# Patient Record
Sex: FEMALE | Race: WHITE
[De-identification: names, ages, dates, MRNs, and addresses within clinical notes are randomized per-mention and may not be internally consistent; named-entity substitution may affect disease eponyms.]

---

## 2018-04-13 ENCOUNTER — HOSPITAL ENCOUNTER (OUTPATIENT)
Dept: HOSPITAL 47 - RADMAMWWP | Age: 83
Discharge: HOME | End: 2018-04-13
Payer: MEDICARE

## 2018-04-13 DIAGNOSIS — R92.8: ICD-10-CM

## 2018-04-13 DIAGNOSIS — N63.11: Primary | ICD-10-CM

## 2018-04-13 PROCEDURE — 76642 ULTRASOUND BREAST LIMITED: CPT

## 2018-04-13 PROCEDURE — 77066 DX MAMMO INCL CAD BI: CPT

## 2018-04-13 NOTE — USB
Reason for exam: additional evaluation requested from abnormal screening.



History:

Patient is postmenopausal and has history of colon cancer at age 60.



US Breast Limited RT

Right breast ultrasound demonstrates a 0.8 x 0.5 x 0.8cm oval, hypoechoic, 

vascular lesion at 1 o'clock.



These results were verbally communicated with the patient and result sheet given 

to the patient on 4/13/18.





ASSESSMENT: Suspicious, BI-RAD 4



RECOMMENDATION:

Ultrasound core biopsy of the right breast.



Called Dr. Asif with mammographic findings and has scheduled an appointment for 

the patient for 5/10/18 at 10:30with Dr. Overton.

Biopsy scheduled for 4/23/18 at 2:00.



PRELIMINARY REPORT CALLED AND FAXED TO DR. OVERTON ON 4/13/18.

## 2018-04-13 NOTE — MM
Reason for exam: clinical finding.

Last mammogram was performed 6 years ago.



History:

Patient is postmenopausal and has history of colon cancer at age 60.



Physical Findings:

Nurse Summary: 2cm nodule in the right breast at 1 o'clock (nurse sharonda).



MG 3D Diag Mammo W/Cad MILDRED

Bilateral CC and MLO view(s) were taken.

Prior study comparison: April 2, 2012, bilateral digital screening mammo w/CAD.  

March 31, 2011, bilateral digital screening mammo w/CAD.

There are scattered fibroglandular densities.  Nodular density at site of clinical

concern right upper breast. Ultrasound is recommended.



These results were verbally communicated with the patient and result sheet given 

to the patient on 4/13/18.





ASSESSMENT: Incomplete: need additional imaging evaluation, BI-RAD 0



RECOMMENDATION:

Ultrasound of the right breast.

## 2018-04-23 ENCOUNTER — HOSPITAL ENCOUNTER (OUTPATIENT)
Dept: HOSPITAL 47 - RADUSWWP | Age: 83
Discharge: HOME | End: 2018-04-23
Payer: MEDICARE

## 2018-04-23 VITALS — RESPIRATION RATE: 16 BRPM

## 2018-04-23 VITALS — TEMPERATURE: 97.5 F | SYSTOLIC BLOOD PRESSURE: 167 MMHG | DIASTOLIC BLOOD PRESSURE: 76 MMHG | HEART RATE: 72 BPM

## 2018-04-23 VITALS — BODY MASS INDEX: 32.1 KG/M2

## 2018-04-23 DIAGNOSIS — Z88.1: ICD-10-CM

## 2018-04-23 DIAGNOSIS — N63.10: Primary | ICD-10-CM

## 2018-04-23 DIAGNOSIS — Z88.2: ICD-10-CM

## 2018-04-23 DIAGNOSIS — Z88.0: ICD-10-CM

## 2018-04-23 DIAGNOSIS — R92.8: ICD-10-CM

## 2018-04-23 PROCEDURE — 88305 TISSUE EXAM BY PATHOLOGIST: CPT

## 2018-04-23 PROCEDURE — 88342 IMHCHEM/IMCYTCHM 1ST ANTB: CPT

## 2018-04-23 PROCEDURE — 88341 IMHCHEM/IMCYTCHM EA ADD ANTB: CPT

## 2018-04-23 PROCEDURE — 19083 BX BREAST 1ST LESION US IMAG: CPT

## 2018-04-23 NOTE — USB
EXAMINATION TYPE: US biopsy breast VAD RT

 

DATE OF EXAM: 4/23/2018

 

CLINICAL HISTORY: Abnormal mammogram R92.8. Right breast mass for which biopsy 
was recommended at the 1:00 position.

 

TECHNIQUE: Ultrasound guided core biopsy of the right breast.  

 

COMPARISON: 4/13/2018

 

FINDINGS: The procedure of ultrasound guided core biopsy was explained to the 
patient.  Benefits, alternatives, and risks were discussed.  An informed 
consent was then obtained. Preprocedural timeout was performed. 

 

The patient was placed in supine positioning for  imaging and for the 
procedure. The overlying skin was prepped and draped in usual sterile fashion.  
Lidocaine buffered with bicarbonate was used as anesthetic into the skin and 
subcutaneous tissue up to the mass at the 1:00 position within the right 
breast.  A nick was made with surgical scalpel.  

 

Under ultrasound guidance, an 18-gauge spinal needle was used to attempt at 
aspiration in consideration that this could represent an epidermal inclusion 
cyst, however no fluid was withdrawn. The patient also notes recent antibiotic 
treatment. Subsequently a 12-gauge vacuum assisted biopsy gun device was used 
to obtain 5 core samples.  Following this, a biopsy clip was left in lesion and 
was well visualized sonographically within the mass.  

 

The patient tolerated the procedure well without any immediate complication.  
The patient was kept in the radiology department for short stay after the 
procedure and then discharged home in stable condition.  

 

 

 

IMPRESSION: Successful, uncomplicated ultrasound guided core biopsy of an 8 mm 
mass at the 1:00 position within the right breast, full pathology results to 
follow. 

 

Pathology Results: Benign



BREAST, RIGHT, 1:00, CORE BIOPSY: BENIGN LYMPH NODE WITH REACTIVE CHANGES AND 
PARTIAL FAT REPLACEMENT. SEE NOTE. 



Recommendation

Follow up ultrasound of the right breast in 6 months.
CARLOS

## 2018-07-13 ENCOUNTER — HOSPITAL ENCOUNTER (INPATIENT)
Dept: HOSPITAL 47 - EC | Age: 83
LOS: 3 days | Discharge: HOME | DRG: 66 | End: 2018-07-16
Attending: HOSPITALIST | Admitting: HOSPITALIST
Payer: MEDICARE

## 2018-07-13 VITALS — BODY MASS INDEX: 31.7 KG/M2

## 2018-07-13 DIAGNOSIS — Z88.0: ICD-10-CM

## 2018-07-13 DIAGNOSIS — I89.0: ICD-10-CM

## 2018-07-13 DIAGNOSIS — Z98.42: ICD-10-CM

## 2018-07-13 DIAGNOSIS — M19.041: ICD-10-CM

## 2018-07-13 DIAGNOSIS — E78.5: ICD-10-CM

## 2018-07-13 DIAGNOSIS — Z80.0: ICD-10-CM

## 2018-07-13 DIAGNOSIS — Z96.1: ICD-10-CM

## 2018-07-13 DIAGNOSIS — R26.9: ICD-10-CM

## 2018-07-13 DIAGNOSIS — R13.10: ICD-10-CM

## 2018-07-13 DIAGNOSIS — Z92.3: ICD-10-CM

## 2018-07-13 DIAGNOSIS — I65.21: ICD-10-CM

## 2018-07-13 DIAGNOSIS — E66.9: ICD-10-CM

## 2018-07-13 DIAGNOSIS — Z88.2: ICD-10-CM

## 2018-07-13 DIAGNOSIS — M19.042: ICD-10-CM

## 2018-07-13 DIAGNOSIS — Z79.82: ICD-10-CM

## 2018-07-13 DIAGNOSIS — R32: ICD-10-CM

## 2018-07-13 DIAGNOSIS — Z88.1: ICD-10-CM

## 2018-07-13 DIAGNOSIS — I61.3: Primary | ICD-10-CM

## 2018-07-13 DIAGNOSIS — Z98.41: ICD-10-CM

## 2018-07-13 DIAGNOSIS — Z90.49: ICD-10-CM

## 2018-07-13 DIAGNOSIS — I16.0: ICD-10-CM

## 2018-07-13 DIAGNOSIS — Z96.653: ICD-10-CM

## 2018-07-13 DIAGNOSIS — Z79.899: ICD-10-CM

## 2018-07-13 DIAGNOSIS — Z85.038: ICD-10-CM

## 2018-07-13 LAB
ALBUMIN SERPL-MCNC: 4 G/DL (ref 3.5–5)
ALP SERPL-CCNC: 90 U/L (ref 38–126)
ALT SERPL-CCNC: 19 U/L (ref 9–52)
ANION GAP SERPL CALC-SCNC: 11 MMOL/L
AST SERPL-CCNC: 18 U/L (ref 14–36)
BASOPHILS # BLD AUTO: 0 K/UL (ref 0–0.2)
BASOPHILS NFR BLD AUTO: 0 %
BUN SERPL-SCNC: 25 MG/DL (ref 7–17)
CALCIUM SPEC-MCNC: 9.5 MG/DL (ref 8.4–10.2)
CHLORIDE SERPL-SCNC: 106 MMOL/L (ref 98–107)
CK SERPL-CCNC: 43 U/L (ref 30–135)
CO2 SERPL-SCNC: 25 MMOL/L (ref 22–30)
EOSINOPHIL # BLD AUTO: 0.2 K/UL (ref 0–0.7)
EOSINOPHIL NFR BLD AUTO: 3 %
ERYTHROCYTE [DISTWIDTH] IN BLOOD BY AUTOMATED COUNT: 4.21 M/UL (ref 3.8–5.4)
ERYTHROCYTE [DISTWIDTH] IN BLOOD: 12.7 % (ref 11.5–15.5)
GLUCOSE SERPL-MCNC: 93 MG/DL (ref 74–99)
HCT VFR BLD AUTO: 36.9 % (ref 34–46)
HGB BLD-MCNC: 12.5 GM/DL (ref 11.4–16)
LYMPHOCYTES # SPEC AUTO: 1.1 K/UL (ref 1–4.8)
LYMPHOCYTES NFR SPEC AUTO: 18 %
MAGNESIUM SPEC-SCNC: 2.2 MG/DL (ref 1.6–2.3)
MCH RBC QN AUTO: 29.7 PG (ref 25–35)
MCHC RBC AUTO-ENTMCNC: 33.9 G/DL (ref 31–37)
MCV RBC AUTO: 87.7 FL (ref 80–100)
MONOCYTES # BLD AUTO: 0.4 K/UL (ref 0–1)
MONOCYTES NFR BLD AUTO: 7 %
NEUTROPHILS # BLD AUTO: 4.5 K/UL (ref 1.3–7.7)
NEUTROPHILS NFR BLD AUTO: 70 %
PH UR: 7.5 [PH] (ref 5–8)
PLATELET # BLD AUTO: 317 K/UL (ref 150–450)
POTASSIUM SERPL-SCNC: 4.5 MMOL/L (ref 3.5–5.1)
PROT SERPL-MCNC: 7.5 G/DL (ref 6.3–8.2)
SODIUM SERPL-SCNC: 142 MMOL/L (ref 137–145)
SP GR UR: 1.01 (ref 1–1.03)
TROPONIN I SERPL-MCNC: <0.012 NG/ML (ref 0–0.03)
UROBILINOGEN UR QL STRIP: <2 MG/DL (ref ?–2)
WBC # BLD AUTO: 6.5 K/UL (ref 3.8–10.6)

## 2018-07-13 PROCEDURE — 93880 EXTRACRANIAL BILAT STUDY: CPT

## 2018-07-13 PROCEDURE — 96361 HYDRATE IV INFUSION ADD-ON: CPT

## 2018-07-13 PROCEDURE — 83735 ASSAY OF MAGNESIUM: CPT

## 2018-07-13 PROCEDURE — 82553 CREATINE MB FRACTION: CPT

## 2018-07-13 PROCEDURE — 85025 COMPLETE CBC W/AUTO DIFF WBC: CPT

## 2018-07-13 PROCEDURE — 99291 CRITICAL CARE FIRST HOUR: CPT

## 2018-07-13 PROCEDURE — 70450 CT HEAD/BRAIN W/O DYE: CPT

## 2018-07-13 PROCEDURE — 70551 MRI BRAIN STEM W/O DYE: CPT

## 2018-07-13 PROCEDURE — 95819 EEG AWAKE AND ASLEEP: CPT

## 2018-07-13 PROCEDURE — 82550 ASSAY OF CK (CPK): CPT

## 2018-07-13 PROCEDURE — 85379 FIBRIN DEGRADATION QUANT: CPT

## 2018-07-13 PROCEDURE — 93005 ELECTROCARDIOGRAM TRACING: CPT

## 2018-07-13 PROCEDURE — 80061 LIPID PANEL: CPT

## 2018-07-13 PROCEDURE — 84484 ASSAY OF TROPONIN QUANT: CPT

## 2018-07-13 PROCEDURE — 80053 COMPREHEN METABOLIC PANEL: CPT

## 2018-07-13 PROCEDURE — 81003 URINALYSIS AUTO W/O SCOPE: CPT

## 2018-07-13 PROCEDURE — 36415 COLL VENOUS BLD VENIPUNCTURE: CPT

## 2018-07-13 PROCEDURE — 96360 HYDRATION IV INFUSION INIT: CPT

## 2018-07-13 PROCEDURE — 71046 X-RAY EXAM CHEST 2 VIEWS: CPT

## 2018-07-13 PROCEDURE — 93306 TTE W/DOPPLER COMPLETE: CPT

## 2018-07-13 RX ADMIN — ASPIRIN 325 MG ORAL TABLET STA: 325 PILL ORAL at 15:28

## 2018-07-13 RX ADMIN — ASPIRIN 325 MG ORAL TABLET STA MG: 325 PILL ORAL at 15:27

## 2018-07-13 NOTE — CT
EXAMINATION TYPE: CT brain wo con

 

DATE OF EXAM: 7/13/2018

 

COMPARISON:

 

HISTORY: Dizzy, elevated blood pressure

 

CT DLP: 1036 mGycm

 

Unenhanced CT of the brain was performed.  

 

The ventricles, basal cisterns and sulci overlying the cerebral convexities demonstrate mild enlargem
ent. 

 

There is no evidence for intracranial hemorrhage or sulcal effacement.  

 

There is decreased attenuation about the periventricular white matter and deep white matter of both c
erebral hemispheres, compatible with chronic small vessel ischemia. Differential diagnosis does inclu
de demyelination. 

 

No mass effects are seen.No midline shift.

 

Osseous calvarium is intact.  

 

If symptoms persist consider MRI.  

 

IMPRESSION:

 

1. Age related atrophic and chronic small vessel ischemic change without acute intracranial process s
een at this time.

## 2018-07-13 NOTE — XR
EXAMINATION TYPE: XR chest 2V

 

DATE OF EXAM: 7/13/2018

 

COMPARISON: 9/17/2014

 

HISTORY: Shortness of breath

 

TECHNIQUE:  Frontal and lateral views of the chest are obtained.

 

FINDINGS:

 

Scattered senescent parenchymal changes noted. Hyperinflation compatible with COPD. 

 

No evidence for infiltrate. No evidence for atelectasis.

 

Heart size is stable.

 

Mediastinal structures are stable and grossly unremarkable.

 

No evidence for hilar prominence.

 

Degenerative changes dorsal spine. 

 

IMPRESSION:

1. No evidence for acute pulmonary disease.

## 2018-07-13 NOTE — P.CNNES
History of Present Illness


Consult date: 07/13/18


Reason for Consult: Patient with altered mental status and dizziness.


History of Present Illness: 





This patient is a 87-year-old right-handed white female who apparently 

yesterday evening noticed that she was having some difficulty with her sense of 

balance.  She states that she is feeling "woozy" which meant she was just not 

feeling well.  She also noted yesterday evening that she was having difficulty 

with her swallowing mechanism as when she tried to drink of fluid it was 

causing her to cough and gag.  She decided to wait until this morning to see if 

her symptoms would improve.  This morning she was still having trouble with her 

swallowing mechanism stating that it was hard for her to drink liquids.  Later 

on in the day she was able to have some toast which apparently did not cause 

trouble.  She still continued to have symptoms of dizziness.  She describes it 

as a feeling of imbalance.  She did not have any true vertigo symptoms at the 

time.  She was also feeling that she was having trouble walking but attributed 

much of this to her known history of sciatica in the past.  The patient decided 

to come to the emergency room today for further evaluation.  She was seen in 

the ER by Dr. Andrea.  Her initial blood pressure reading in the ER was 193/78 

which was quite elevated and she was treated for this.  The patient was 

reevaluated in the ER by Dr. Andrea and she still had difficulty with swallowing.

  She was admitted to hospital for further evaluation.  ENT consultation is 

pending.  Patient denies any previous history of TIA or stroke.  She does have 

history of underlying colon cancer which was diagnosed in 1991.  She underwent 

resection with chemotherapy.  She has yearly colonoscopy procedures.  She also 

has a history of bilateral knee surgeries in 2010.  She states she had been 

ambulating well but this new weakness came on suddenly.  The patient did 

undergo a computed tomography scan of the brain in the ER which revealed age 

related atrophy and chronic small vessel ischemic changes.  No acute 

intracranial abnormality was detected.  We reviewed the CAT scan results today 

with the patient.  We'll recommend patient undergo MRI of the brain to rule out 

any brainstem ischemia.  We will need to wait to see how she her ENT evaluation 

turns out in terms of her swallowing difficulties.  She does have a gag reflex 

and does seem to do well with liquids and soft foods at this time without 

showing signs of gagging.  She may require speech therapy consultation as well.

  The patient is unable to take aspirin as she has a high bleeding tendency.  

She is now been admitted and neurology has been consulted for further 

evaluation and recommendations.





Review of Systems


Constitutional: Denies chills, Denies fever


Eyes: denies blurred vision, denies pain


Ears, nose, mouth and throat: Denies headache, Denies sore throat


Cardiovascular: Denies chest pain, Denies shortness of breath


Respiratory: Denies cough


Gastrointestinal: Denies abdominal pain, Denies diarrhea, Denies nausea, Denies 

vomiting


Genitourinary: Denies dysuria, Denies hematuria


Musculoskeletal: Denies myalgias


Integumentary: Denies pruritus, Denies rash


Neurological: Reports change in mentation, Reports confusion, Reports gait 

dysfunction, Reports motor disturbance, Denies numbness, Denies weakness


Psychiatric: Denies anxiety, Denies depression


Endocrine: Denies fatigue, Denies weight change





Past Medical History


Past Medical History: Cancer, Hypertension


Additional Past Medical History / Comment(s): anemia, hx colon cancer, 

lymphedema right arm


History of Any Multi-Drug Resistant Organisms: None Reported


Past Surgical History: Bowel Resection, Joint Replacement


Additional Past Surgical History / Comment(s): cesar knee replacements, cesar 

cataract with lens implants


Past Anesthesia/Blood Transfusion Reactions: No Reported Reaction


Past Psychological History: No Psychological Hx Reported


Smoking Status: Never smoker


Past Alcohol Use History: None Reported


Past Drug Use History: None Reported





- Past Family History


  ** Mother


Family Medical History: Cancer


Additional Family Medical History / Comment(s): colon





Medications and Allergies


 Home Medications











 Medication  Instructions  Recorded  Confirmed  Type


 


Cholecalciferol [Vitamin D3] 1,000 unit PO DAILY 12/11/17 07/13/18 History


 


Ferrous Sulfate [Feosol] 325 mg PO Q48H 12/11/17 07/13/18 History


 


Losartan/Hydrochlorothiazide 1 tab PO DAILY 12/11/17 07/13/18 History





[Hyzaar 100-25 Tablet]    


 


Multivitamins, Thera [Multivitamin 1 tab PO Q48H 12/11/17 07/13/18 History





(formulary)]    


 


Omega-3 Fatty Acids [Omega-3] 1,000 mg PO DAILY 04/16/18 07/13/18 History


 


Cetirizine HCl [Zyrtec] 10 mg PO DAILY 07/13/18 07/13/18 History











 Allergies











Allergy/AdvReac Type Severity Reaction Status Date / Time


 


clarithromycin [From Biaxin] Allergy  Rash/Hives Verified 07/13/18 12:11


 


Penicillins Allergy  Unknown Verified 07/13/18 12:11


 


Sulfa (Sulfonamide Allergy  ringing in Verified 07/13/18 12:11





Antibiotics)   ears  














Physical Examination





- Vital Signs


Vital Signs: 


 Vital Signs











  Temp Pulse Pulse Resp BP BP Pulse Ox


 


 07/13/18 17:52  97.3 F L  56 L   18  184/73   97


 


 07/13/18 16:15    73  18   184/72  97


 


 07/13/18 16:10   71   16  192/79   98


 


 07/13/18 15:32    78  18   199/76  97


 


 07/13/18 14:34   78   18  185/81   98


 


 07/13/18 11:31  98.4 F  75   18  193/78   99








 Intake and Output











 07/13/18 07/13/18 07/13/18





 06:59 14:59 22:59


 


Other:   


 


  Weight  78.653 kg 














- Constitutional


General appearance: average body habitus, cooperative





- EENT


EENT: PERRL, mucous membranes moist





- Respiratory


Respiratory: lungs clear, normal breath sounds





- Cardiovascular


Cardiovascular: regular rate, normal S1, normal S2


Extremities: no peripheral edema bilaterally





- Gastrointestinal


Gastrointestinal: normoactive bowel sounds





- Integumentary


Integumentary: normal





- Neurologic


Cranial nerve examination: PERRL, EOMI, VFF, face symmetric, intact gag reflex, 

intact corneal reflex, normal palatal elevation


Speech examination: intact


Sensorimotor examination: intact


Motor examination - right side: 4/5: biceps, triceps, wrist flexion, wrist 

extension, , hip flexors, knee extensors, dorsiflexion, toe extension (EHL)

, plantarflexion


Motor examination - left side: 4/5: biceps, triceps, wrist flexion, wrist 

extension, , hip flexors, knee extensors, dorsiflexion, toe extension (EHL)

, plantarflexion


Detailed sensory examination: intact


Reflex and gait examination: intact


Reflexes: 1+: ankle, bicep, knee, tricep





- Musculoskeletal


Musculoskeletal: no pain





- Psychiatric


Psychiatric: mood/affect appropriate, cooperative





Results





- Laboratory Findings


CBC and BMP: 


 07/13/18 12:06





 07/13/18 12:06


Abnormal Lab Findings: 


 Abnormal Labs











  07/13/18 07/13/18





  12:06 12:06


 


D-Dimer   0.65 H


 


BUN  25 H 














Assessment and Plan


(1) Transient brainstem ischemia


Current Visit: Yes   Status: Acute   Code(s): G45.8 - OTH TRANSIENT CEREBRAL 

ISCHEMIC ATTACKS AND RELATED SYND   SNOMED Code(s): 99166352


   





(2) Dysphagia


Current Visit: Yes   Status: Acute   Code(s): R13.10 - DYSPHAGIA, UNSPECIFIED   

SNOMED Code(s): 54718197


   





(3) Dizziness


Current Visit: Yes   Status: Acute   Code(s): R42 - DIZZINESS AND GIDDINESS   

SNOMED Code(s): 597833317


   





(4) History of colon cancer


Current Visit: Yes   Status: Acute   Code(s): Z85.038 - PERSONAL HISTORY OF 

MALIGNANT NEOPLASM OF LARGE INTESTINE   SNOMED Code(s): 435270420


   


Plan: 





This patient is a pleasant 87-year-old right-handed white female who was 

admitted hospital today with symptoms of difficulty swallowing as well as 

trouble ambulating at home.  Symptoms did not improve this morning on awakening 

and she decided to come to the emergency room.  In the ER she was evaluated by 

Dr. Andrea.  She had evidence of hypertensive urgency.  She was sent for computed 

tomography scan of the brain results which are noted above.  She was 

subsequently admitted to hospital for further evaluation.  The patient denies 

any previous history of TIA or stroke.  She did have clear evidence of 

dysphagia yesterday and this morning which apparently has improved since 

admission to the hospital.  She is to be evaluated by ENT with consultation.  

Her neurological examination at this time is nonfocal.  We have recommended she 

undergo an MRI of the brain for further evaluation of brainstem ischemia given 

her swallowing difficulties.  She is unable to take aspirin as she has bleeding 

tendency.  We will obtain a full stroke workup for the patient during this 

admission.  Her overall prognosis at this time remains very guarded.


Time with Patient: Greater than 30

## 2018-07-13 NOTE — ED
Dizziness HPI





- General


Chief Complaint: Dizziness


Stated Complaint: Dizzy/high blood pressure


Time Seen by Provider: 07/13/18 11:40


Source: patient, family, RN notes reviewed


Mode of arrival: wheelchair


Limitations: no limitations





- History of Present Illness


Initial Comments: 





This is a 87-year-old female with no prior history of stroke she states she had 

the onset last evening of difficulty swallowing and trouble walking.  She 

states she felt dizzy his slight frontal headache and feels fuzzy "".  No focal 

weakness to her arms or legs she states her blood pressure was elevated 197/76.

  He denies any recent illnesses she does have sciatic nerve problems but does 

not relate this current problem with the sciatica.  No other complaints no 

other modifying factors at this time.  Per her family her speech seems to be 

normal the patient states she had some slight trouble with speech and was 

thinking about what she said before she would say it.


MD Complaint: lightheadedness, difficulty walking





- Related Data


 Home Medications











 Medication  Instructions  Recorded  Confirmed


 


Cholecalciferol [Vitamin D3] 1,000 unit PO DAILY 12/11/17 07/13/18


 


Ferrous Sulfate [Feosol] 325 mg PO Q48H 12/11/17 07/13/18


 


Losartan/Hydrochlorothiazide 1 tab PO DAILY 12/11/17 07/13/18





[Hyzaar 100-25 Tablet]   


 


Multivitamins, Thera [Multivitamin 1 tab PO Q48H 12/11/17 07/13/18





(formulary)]   


 


Omega-3 Fatty Acids [Omega-3] 1,000 mg PO DAILY 04/16/18 07/13/18


 


Cetirizine HCl [Zyrtec] 10 mg PO DAILY 07/13/18 07/13/18











 Allergies











Allergy/AdvReac Type Severity Reaction Status Date / Time


 


clarithromycin [From Biaxin] Allergy  Rash/Hives Verified 07/13/18 12:11


 


Penicillins Allergy  Unknown Verified 07/13/18 12:11


 


Sulfa (Sulfonamide Allergy  ringing in Verified 07/13/18 12:11





Antibiotics)   ears  














Review of Systems


ROS Statement: 


Those systems with pertinent positive or pertinent negative responses have been 

documented in the HPI.





ROS Other: All systems not noted in ROS Statement are negative.





Past Medical History


Past Medical History: Cancer, Hypertension


Additional Past Medical History / Comment(s): anemia, hx colon cancer, 

lymphedema right arm


History of Any Multi-Drug Resistant Organisms: None Reported


Past Surgical History: Bowel Resection, Joint Replacement


Additional Past Surgical History / Comment(s): cesar knee replacements, cesar 

cataract with lens implants


Past Anesthesia/Blood Transfusion Reactions: No Reported Reaction


Past Psychological History: No Psychological Hx Reported


Smoking Status: Never smoker


Past Alcohol Use History: None Reported


Past Drug Use History: None Reported





- Past Family History


  ** Mother


Family Medical History: Cancer


Additional Family Medical History / Comment(s): colon





General Exam





- General Exam Comments


Initial Comments: 





This is a well-developed well-nourished awake alert oriented 3 female


Limitations: no limitations


General appearance: alert, in no apparent distress


Head exam: Present: atraumatic, normocephalic, normal inspection


Eye exam: Present: normal appearance, PERRL, EOMI.  Absent: scleral icterus, 

conjunctival injection, periorbital swelling


ENT exam: Present: mucous membranes dry


Neck exam: Present: normal inspection.  Absent: tenderness, meningismus, 

lymphadenopathy


Respiratory exam: Present: normal lung sounds bilaterally.  Absent: respiratory 

distress, wheezes, rales, rhonchi, stridor


Cardiovascular Exam: Present: regular rate, normal rhythm, normal heart sounds.

  Absent: systolic murmur, diastolic murmur, rubs, gallop, clicks


GI/Abdominal exam: Present: soft, normal bowel sounds.  Absent: distended, 

tenderness, guarding, rebound, rigid


Extremities exam: Present: normal inspection, full ROM, normal capillary 

refill.  Absent: tenderness, pedal edema, joint swelling, calf tenderness


Back exam: Present: normal inspection


Neurological exam: Present: alert, oriented X3, CN II-XII intact


Psychiatric exam: Present: normal affect, normal mood


Skin exam: Present: warm, dry, intact, normal color.  Absent: rash





Course


 Vital Signs











  07/13/18 07/13/18





  11:31 14:34


 


Temperature 98.4 F 


 


Pulse Rate 75 78


 


Respiratory 18 18





Rate  


 


Blood Pressure 193/78 185/81


 


O2 Sat by Pulse 99 98





Oximetry  














- Reevaluation(s)


Reevaluation #1: 





07/13/18 15:12


Reevaluation patient reveals no change in her status she still is difficulty 

swallowing was able ambulate however.  No focal deficits.





EKG Findings





- EKG Results:


EKG: interpreted by JOSE, sinus rhythm (Sinus rhythm of 62 UT interval 150 QRS 

138 QT since /448 red bundle-branch block pattern possible lateral 

infarct of indeterminate age.)





Medical Decision Making





- Medical Decision Making





I did discuss findings with the patient family and with Dr. Contreras patient 

will be admitted with ENT and neurology evaluation.





- Lab Data


Result diagrams: 


 07/13/18 12:06





 07/13/18 12:06


 Lab Results











  07/13/18 07/13/18 07/13/18 Range/Units





  12:06 12:06 12:06 


 


WBC   6.5   (3.8-10.6)  k/uL


 


RBC   4.21   (3.80-5.40)  m/uL


 


Hgb   12.5   (11.4-16.0)  gm/dL


 


Hct   36.9   (34.0-46.0)  %


 


MCV   87.7   (80.0-100.0)  fL


 


MCH   29.7   (25.0-35.0)  pg


 


MCHC   33.9   (31.0-37.0)  g/dL


 


RDW   12.7   (11.5-15.5)  %


 


Plt Count   317   (150-450)  k/uL


 


Neutrophils %   70   %


 


Lymphocytes %   18   %


 


Monocytes %   7   %


 


Eosinophils %   3   %


 


Basophils %   0   %


 


Neutrophils #   4.5   (1.3-7.7)  k/uL


 


Lymphocytes #   1.1   (1.0-4.8)  k/uL


 


Monocytes #   0.4   (0-1.0)  k/uL


 


Eosinophils #   0.2   (0-0.7)  k/uL


 


Basophils #   0.0   (0-0.2)  k/uL


 


D-Dimer     (<0.60)  mg/L FEU


 


Sodium    142  (137-145)  mmol/L


 


Potassium    4.5  (3.5-5.1)  mmol/L


 


Chloride    106  ()  mmol/L


 


Carbon Dioxide    25  (22-30)  mmol/L


 


Anion Gap    11  mmol/L


 


BUN    25 H  (7-17)  mg/dL


 


Creatinine    0.90  (0.52-1.04)  mg/dL


 


Est GFR (CKD-EPI)AfAm    67  (>60 ml/min/1.73 sqM)  


 


Est GFR (CKD-EPI)NonAf    58  (>60 ml/min/1.73 sqM)  


 


Glucose    93  (74-99)  mg/dL


 


Calcium    9.5  (8.4-10.2)  mg/dL


 


Magnesium    2.2  (1.6-2.3)  mg/dL


 


Total Bilirubin    0.3  (0.2-1.3)  mg/dL


 


AST    18  (14-36)  U/L


 


ALT    19  (9-52)  U/L


 


Alkaline Phosphatase    90  ()  U/L


 


Total Creatine Kinase  43    ()  U/L


 


CK-MB (CK-2)  1.0    (0.0-2.4)  ng/mL


 


CK-MB (CK-2) Rel Index  2.3    


 


Troponin I  <0.012    (0.000-0.034)  ng/mL


 


Total Protein    7.5  (6.3-8.2)  g/dL


 


Albumin    4.0  (3.5-5.0)  g/dL


 


Urine Color     


 


Urine Appearance     (Clear)  


 


Urine pH     (5.0-8.0)  


 


Ur Specific Gravity     (1.001-1.035)  


 


Urine Protein     (Negative)  


 


Urine Glucose (UA)     (Negative)  


 


Urine Ketones     (Negative)  


 


Urine Blood     (Negative)  


 


Urine Nitrite     (Negative)  


 


Urine Bilirubin     (Negative)  


 


Urine Urobilinogen     (<2.0)  mg/dL


 


Ur Leukocyte Esterase     (Negative)  














  07/13/18 07/13/18 Range/Units





  12:06 14:36 


 


WBC    (3.8-10.6)  k/uL


 


RBC    (3.80-5.40)  m/uL


 


Hgb    (11.4-16.0)  gm/dL


 


Hct    (34.0-46.0)  %


 


MCV    (80.0-100.0)  fL


 


MCH    (25.0-35.0)  pg


 


MCHC    (31.0-37.0)  g/dL


 


RDW    (11.5-15.5)  %


 


Plt Count    (150-450)  k/uL


 


Neutrophils %    %


 


Lymphocytes %    %


 


Monocytes %    %


 


Eosinophils %    %


 


Basophils %    %


 


Neutrophils #    (1.3-7.7)  k/uL


 


Lymphocytes #    (1.0-4.8)  k/uL


 


Monocytes #    (0-1.0)  k/uL


 


Eosinophils #    (0-0.7)  k/uL


 


Basophils #    (0-0.2)  k/uL


 


D-Dimer  0.65 H   (<0.60)  mg/L FEU


 


Sodium    (137-145)  mmol/L


 


Potassium    (3.5-5.1)  mmol/L


 


Chloride    ()  mmol/L


 


Carbon Dioxide    (22-30)  mmol/L


 


Anion Gap    mmol/L


 


BUN    (7-17)  mg/dL


 


Creatinine    (0.52-1.04)  mg/dL


 


Est GFR (CKD-EPI)AfAm    (>60 ml/min/1.73 sqM)  


 


Est GFR (CKD-EPI)NonAf    (>60 ml/min/1.73 sqM)  


 


Glucose    (74-99)  mg/dL


 


Calcium    (8.4-10.2)  mg/dL


 


Magnesium    (1.6-2.3)  mg/dL


 


Total Bilirubin    (0.2-1.3)  mg/dL


 


AST    (14-36)  U/L


 


ALT    (9-52)  U/L


 


Alkaline Phosphatase    ()  U/L


 


Total Creatine Kinase    ()  U/L


 


CK-MB (CK-2)    (0.0-2.4)  ng/mL


 


CK-MB (CK-2) Rel Index    


 


Troponin I    (0.000-0.034)  ng/mL


 


Total Protein    (6.3-8.2)  g/dL


 


Albumin    (3.5-5.0)  g/dL


 


Urine Color   Colorless  


 


Urine Appearance   Clear  (Clear)  


 


Urine pH   7.5  (5.0-8.0)  


 


Ur Specific Gravity   1.006  (1.001-1.035)  


 


Urine Protein   Negative  (Negative)  


 


Urine Glucose (UA)   Negative  (Negative)  


 


Urine Ketones   Negative  (Negative)  


 


Urine Blood   Negative  (Negative)  


 


Urine Nitrite   Negative  (Negative)  


 


Urine Bilirubin   Negative  (Negative)  


 


Urine Urobilinogen   <2.0  (<2.0)  mg/dL


 


Ur Leukocyte Esterase   Negative  (Negative)  














- Radiology Data


Radiology results: report reviewed (I did review the imaging and reports no 

acute findings.), image reviewed





Critical Care Time


Critical Care Time: Yes


Critical Care Time: 





31 minutes of critical care time which includes initial presentation with 

history physical labs x-rays several reevaluation of the patient.  Discussed 

with the patient family regarding findings review of old charting that was 

available.  Discussed with the beta physician admission orders and 

documentation of the above





Disposition


Clinical Impression: 


 CVA (cerebral vascular accident)





Disposition: ADMITTED AS IP TO [facility] [place]


Condition: Stable


Referrals: 


Kuldip Luna MD [Primary Care Provider] - 1-2 days

## 2018-07-14 LAB
CHOLEST SERPL-MCNC: 218 MG/DL (ref ?–200)
HDLC SERPL-MCNC: 68 MG/DL (ref 40–60)
LDLC SERPL CALC-MCNC: 131 MG/DL (ref 0–99)
TRIGL SERPL-MCNC: 97 MG/DL (ref ?–150)

## 2018-07-14 RX ADMIN — ASPIRIN 325 MG ORAL TABLET SCH: 325 PILL ORAL at 09:46

## 2018-07-14 RX ADMIN — Medication SCH UNIT: at 09:46

## 2018-07-14 RX ADMIN — LOSARTAN POTASSIUM AND HYDROCHLOROTHIAZIDE SCH EACH: 12.5; 5 TABLET ORAL at 19:51

## 2018-07-14 RX ADMIN — ATORVASTATIN CALCIUM SCH MG: 40 TABLET, FILM COATED ORAL at 19:51

## 2018-07-14 RX ADMIN — Medication SCH MG: at 09:46

## 2018-07-14 NOTE — US
EXAMINATION TYPE: US carotid duplex BILAT

 

DATE OF EXAM: 7/14/2018

 

COMPARISON: NONE

 

CLINICAL HISTORY: Patient with TIA and swallowing problems..

 

EXAM MEASUREMENTS: 

 

RIGHT:  Peak Systolic Velocity (PSV) cm/sec

----- Right CCA:  102.3  

----- Right ICA:  145.37     

----- Right ECA:  80.9   

ICA/CCA ratio:  1.4    

 

RIGHT:  End Diastole cm/sec

----- Right CCA:  11.6   

----- Right ICA:  27.4      

----- Right ECA:  0.0     

 

LEFT:  Peak Systolic Velocity (PSV) cm/sec

----- Left CCA:  92.0  

----- Left ICA:  95.1   

----- Left ECA:  98.4  

ICA/CCA ratio:  1.0  

 

LEFT:  End Diastole cm/sec

----- Left CCA:  12.7  

----- Left ICA:  20.8   

----- Left ECA:  0.0 

 

VERTEBRALS (direction of flow):

Right Vertebral: Antegrade

Left Vertebral: Antegrade

50-69% by diameter stenosis of the proximal right ICA.

Rhythm:  Normal

 

Mild atherosclerotic

 

IMPRESSION:  

 

50-69% BY DIAMETER STENOSIS OF THE PROXIMAL RIGHT ICA.   

 

 

Criteria for Assigning % of Stenosis / Diameter reduction

(Estimation based on the indirect measurements of the internal carotid artery velocities (ICA PSV).

1.  Normal (no stenosis)=ICA PSV < 125 cm/s: ratio < 2.0: ICA EDV<40 cm/s.

2. Less than 50% stenosis=ICA PSV < 125 cm/s: ratio < 2.0: ICA EDV<40 cm/s.

3.  50 to 69% stenosis=ICA PSV of 125 to 230 cm/s: ration 2.0 ? 4.0: ICA EDV  cm/s.

4.  Greater than 70% stenosis to near occlusion= ICA PSV > 230 cm/s: ratio > 4.0: ICA EDV > 100 cm/s.
 

5.  Near occlusion= ICA PSV velocities may be low or undetectable: variable ratio and ICA EDV.

6.  Total occlusion=unable to detect flow.

## 2018-07-14 NOTE — HP
HISTORY AND PHYSICAL



DATE OF ADMISSION:

07/13/2018



DATE OF SERVICE:

07/14/2018



PRESENTING COMPLAINT:

Difficulty walking.



HISTORY OF PRESENTING COMPLAINT:

This is a very pleasant 87-year-old patient of Dr. Luna.  Chronic stable medical

conditions include hypertension, urinary incontinence and chronic lymphedema of the

right arm.  Lymphedema has been worked up by Dr. Asif. No cause has been found.  Two

nights ago the patient felt what she described as feeling woozy, just did not feel

well. She went to bed. The next morning she felt the same way. Her blood pressure was

up to 197 systolic.  She noticed that her speech was a bit slow and she had to stop and

think to bring her words out.  She also noticed weakness in the legs, especially in the

left leg, some trouble swallowing, and she decided to come into the ER. Initial CT scan

of the brain did not show any acute event.  Patient was admitted with a stroke/TIA

workup.  Patient has had knees replaced.  Hence it is unclear at this point if patient

can actually have an MRI.  She is pending the same. Patient does feel a bit better,

pretty close to her baseline.  Patient's family is present at the bedside.  No prior

history of stroke.  No change in her vision. Swallowing has actually improved.  She

thinks her speech actually has also improved. Patient at her baseline is a bit unsteady

on her feet.



REVIEW OF SYSTEMS:

CONSTITUTIONAL: None.

HEENT:  As above.

RESPIRATORY: None.

CARDIOVASCULAR: None.

GASTROINTESTINAL: None.

GENITOURINARY: Incontinence.

DERMATOLOGICAL: None.

HEMATOLOGICAL: None.

LYMPHATICS: None.

PSYCHIATRY: None.

NEUROLOGICAL: As above.



PAST MEDICAL HISTORY:

1. Hypertension.

2. Anemia.

3. Colon cancer treated with radiation treatment.

4. Chronic right arm lymphedema, cause unknown.



PAST SURGICAL HISTORY:

1. Bowel resection.

2. Bilateral knee replacement.

3. Bilateral cataract with lens.



SOCIAL HISTORY:

Does not smoke or drink alcohol.  Lives by herself. Family lives close by.



FAMILY HISTORY:

Colon cancer.



HOME MEDICATIONS:

1. Omega-3 1000 mg p.o. daily.

2. Multivitamin 1 tablet q.48 hours.

3. Hyzaar 100/25 one tablet p.o. daily.

4. Iron 325 p.o. q.48 hours.

5. Vitamin D3 1000 units p.o. daily.

6. Zyrtec 10 mg p.o. daily.



ALLERGIES:

1. CLARITHROMYCIN.

2. PENICILLIN.

3. SULFA.



PHYSICAL EXAMINATION:

VITAL SIGNS ON PRESENTATION: Temperature 98.4, pulse 75, respiration 18, blood pressure

193/78, pulse ox 99% on room air.

GENERAL APPEARANCE:  Well built; BMI 31.8. Sitting up. Comfortable.

EYES: Pupils equal. Conjunctivae normal.

HEENT: External appearance of nose and ears normal. Oral cavity normal.

NECK: JVD not raised.  Mass not palpable.

RESPIRATORY: Effort normal. Lungs are clear.

CARDIOVASCULAR: First and second sounds normal. No edema.

ABDOMEN: Soft, non-tender. Liver and spleen not palpable.

LYMPHATIC: No lymph node palpable in neck or axillae.

PSYCHIATRY: Alert and oriented x3. Mood and affect normal.

NEUROLOGICAL:  Pupils equal. No facial asymmetry.  Power and sensation grossly intact.



INVESTIGATIONS:

White count 6.5, hemoglobin 12.5, potassium 4.5, BUN 25, creatinine 0.90, .  UA

negative.



CT scan of the brain shows chronic changes.  Chest x-ray film interpreted by me shows

some cardiomegaly; lung fields otherwise clear. EKG tracing interpreted by me shows

right bundle branch block. Carotid Doppler shows proximal right ICA 50% to 69%.



ASSESSMENT:

1. This is a patient who presents with some change in swallowing and speech becoming

    slow, weak in the left leg, symptoms lasting for quite some time.  This could be a

    TIA/acute stroke.  Initial CT scan that can often be negative was unremarkable.

    Patient's symptoms have improved.  Cannot do an MRI because of knee implant.

2. Obesity with body mass index of 31.3.

3. Hyperlipidemia, uncontrolled.  LDL is 131.

4. Essential hypertension, accelerated.

5. Chronic lymphedema of the right arm, idiopathic.

6. Primary osteoarthritis, especially of the hands.



PLAN:

Neuro checks are in place.  Will get an opinion from [facility] to see if

patient can have an MRI. Will get a 2-D echocardiogram.  Patient is on aspirin. Lipitor

will be added.  Care was discussed in detail with the patient and family.  Questions

were answered. Neurology consultation was done.





MMODL / IJN: [id number] / Job#: [id number]

## 2018-07-14 NOTE — P.PN
Subjective


Progress Note Date: 07/14/18





This patient is a 87-year-old female who was admitted just stay for symptoms of 

dizziness and dysphagia.  The patient presented with new onset of symptoms and 

was initially seen in the emergency room for possibility of TIA versus stroke.  

She underwent a computed tomography scan of the brain which failed to reveal 

any acute changes.  She was admitted to Hospital for further evaluation.  

Yesterday her neurological examination was showing significant improvement with 

her dysphagia.  She is waiting to be seen in consultation by ENT for further 

evaluation.  Given her history of dysphagia and dizziness it was recommended 

the patient undergo MRI of the brain.  Apparently she told the nursing staff 

today that she would decline the MRI as she is undergone bilateral knee 

replacements.  After checking with MRI this was not a contraindication for her 

MRI study for today however the patient has declined to have MRI brain done at 

this time.  Patient underwent carotid Doppler ultrasound today and results 

indicated 5069 percent stenosis of the proximal right ICA.  Patient seems to 

be doing fairly well today.  She denies any difficulty with her swallowing 

today.  She is still awaiting ENT consultation.  According to the nursing staff 

orthopedic surgery has been consulted for evaluation of her knee replacements 

and compatibility for MRI.  We will continue to follow her progress closely 

during this admission.  We have discussed all results with her thus far in 

detail and she does seem to be doing better today.  She denies any further 

swallowing difficulties.  She has been up and ambulating to the bathroom 

without any problem.  We will continue close neurological follow-up for the 

patient during this admission.





Objective





- Vital Signs


Vital signs: 


 Vital Signs











Temp  97.0 F L  07/14/18 08:00


 


Pulse  72   07/14/18 08:00


 


Resp  16   07/14/18 08:00


 


BP  156/75   07/14/18 08:00


 


Pulse Ox  96   07/14/18 09:01








 Intake & Output











 07/13/18 07/14/18 07/14/18





 18:59 06:59 18:59


 


Intake Total  480 480


 


Balance  480 480


 


Weight 78.653 kg 77.6 kg 


 


Intake:   


 


  Oral  480 480


 


Other:   


 


  Voiding Method  Toilet 


 


  # Voids  0 1














- Exam





Physical examination:





PHYSICAL EXAMINATION: Patient is resting comfortably in bed. 


VITAL SIGNS: Blood pressure is [136/59]. Heart rate is [86]. Respiration is [18]

. Temperature is [98.9].


HEENT: Head is atraumatic, neck is supple, there were no carotid bruits.


CHEST: Lungs are clear to auscultation and percussion.  


CARDIAC: S1, S2 normal rate and rhythm. There is no murmur.


ABDOMEN: Soft and nontender. Bowel sounds are present.


EXTREMITIES:  There is no pedal edema.  Peripheral pulses are present.





Neurological examination:





Patient has a nonfocal neurological examination today.  Patient denies any 

swallowing difficulties at this time and is been up and ambulating without 

vertigo or dizzy spells.





- Labs


CBC & Chem 7: 


 07/13/18 12:06





 07/13/18 12:06


Labs: 


 Abnormal Lab Results - Last 24 Hours (Table)











  07/14/18 Range/Units





  06:16 


 


Cholesterol  218 H  (<200)  mg/dL


 


LDL Cholesterol, Calc  131 H  (0-99)  mg/dL


 


HDL Cholesterol  68 H  (40-60)  mg/dL














Assessment and Plan


(1) Transient brainstem ischemia


Current Visit: Yes   Status: Acute   Code(s): G45.8 - OTH TRANSIENT CEREBRAL 

ISCHEMIC ATTACKS AND RELATED SYND   SNOMED Code(s): 37166877


   





(2) Dysphagia


Current Visit: Yes   Status: Acute   Code(s): R13.10 - DYSPHAGIA, UNSPECIFIED   

SNOMED Code(s): 71821962


   





(3) Dizziness


Current Visit: Yes   Status: Acute   Code(s): R42 - DIZZINESS AND GIDDINESS   

SNOMED Code(s): 846255271


   





(4) History of colon cancer


Current Visit: Yes   Status: Acute   Code(s): Z85.038 - PERSONAL HISTORY OF 

MALIGNANT NEOPLASM OF LARGE INTESTINE   SNOMED Code(s): 838092502


   


Plan: 





This patient is a 87-year-old female who was admitted to hospital yesterday 

with symptoms of dizziness and swallowing difficulty.  She seems to be doing 

much better today and has had no further episodes of gagging or difficulty with 

her swallow.  She is taking liquids and solids quite easily.  She underwent 

carotid Doppler ultrasound which reveals right ICA stenosis of 5069 percent.  

She is recommended to undergo MRI of the brain however there is concern 

regarding her knee replacements.  MRIs on hold.  She otherwise seems to be 

doing better overall and is been up and ambulating.  Her clinical symptoms 

suggest possibility of brainstem ischemia producing dizziness and swallowing 

difficulty.  If possible we will try to obtain her MRI if she is cleared 

regarding her knee replacements.  Overall prognosis at this time remains 

guarded.  We will continue to follow her progress closely during this admission.

## 2018-07-15 RX ADMIN — Medication SCH UNIT: at 08:32

## 2018-07-15 RX ADMIN — LOSARTAN POTASSIUM AND HYDROCHLOROTHIAZIDE SCH EACH: 12.5; 5 TABLET ORAL at 19:51

## 2018-07-15 RX ADMIN — ATORVASTATIN CALCIUM SCH MG: 40 TABLET, FILM COATED ORAL at 19:51

## 2018-07-15 RX ADMIN — LOSARTAN POTASSIUM AND HYDROCHLOROTHIAZIDE SCH EACH: 12.5; 5 TABLET ORAL at 08:32

## 2018-07-15 RX ADMIN — ASPIRIN 325 MG ORAL TABLET SCH: 325 PILL ORAL at 08:31

## 2018-07-15 NOTE — P.PN
Subjective


Progress Note Date: 07/15/18





This patient is a 87-year-old female who was admitted just stay for symptoms of 

dizziness and dysphagia.  The patient presented with new onset of symptoms and 

was initially seen in the emergency room for possibility of TIA versus stroke.  

She underwent a computed tomography scan of the brain which failed to reveal 

any acute changes.  She was admitted to Hospital for further evaluation.  

Yesterday her neurological examination was showing significant improvement with 

her dysphagia.  She is waiting to be seen in consultation by ENT for further 

evaluation.  Given her history of dysphagia and dizziness it was recommended 

the patient undergo MRI of the brain.  Apparently she told the nursing staff 

today that she would decline the MRI as she is undergone bilateral knee 

replacements.  After checking with MRI this was not a contraindication for her 

MRI study for today however the patient has declined to have MRI brain done at 

this time.  Patient underwent carotid Doppler ultrasound today and results 

indicated 5069 percent stenosis of the proximal right ICA.  Patient seems to 

be doing fairly well today.  She denies any difficulty with her swallowing 

today.  She is still awaiting ENT consultation.  According to the nursing staff 

orthopedic surgery has been consulted for evaluation of her knee replacements 

and compatibility for MRI.  We will continue to follow her progress closely 

during this admission.  We have discussed all results with her thus far in 

detail and she does seem to be doing better today.  According to the patient 

she has been given okay to proceed with MRI of the brain.  This is been 

scheduled for her to be done tomorrow.  She will also have her EEG done 

tomorrow and this will be reviewed.  She denies any further swallowing 

difficulties.  She has been up and ambulating to the bathroom without any 

problem.  We will continue close neurological follow-up for the patient during 

this admission.





Objective





- Vital Signs


Vital signs: 


 Vital Signs











Temp  97.4 F L  07/15/18 12:00


 


Pulse  80   07/15/18 12:00


 


Resp  18   07/15/18 12:00


 


BP  177/91   07/15/18 12:00


 


Pulse Ox  99   07/15/18 12:00








 Intake & Output











 07/14/18 07/15/18 07/15/18





 18:59 06:59 18:59


 


Intake Total 960 480 480


 


Balance 960 480 480


 


Weight  78.4 kg 


 


Intake:   


 


  Oral 960 480 480


 


Other:   


 


  Voiding Method  Toilet 


 


  # Voids 2 0 1














- Exam





Physical examination:





PHYSICAL EXAMINATION: Patient is resting comfortably in bed. 


VITAL SIGNS: Blood pressure is [159/74]. Heart rate is [72]. Respiration is [16]

. Temperature is [97.8].


HEENT: Head is atraumatic, neck is supple, there were no carotid bruits.


CHEST: Lungs are clear to auscultation and percussion.  


CARDIAC: S1, S2 normal rate and rhythm. There is no murmur.


ABDOMEN: Soft and nontender. Bowel sounds are present.


EXTREMITIES:  There is no pedal edema.  Peripheral pulses are present.





Neurological examination:





Patient has a nonfocal neurological examination today.  Patient denies any 

swallowing difficulties at this time and is been up and ambulating without 

vertigo or dizzy spells.  Patient denies any swallowing difficulties today.





- Labs


CBC & Chem 7: 


 07/13/18 12:06





 07/13/18 12:06





Assessment and Plan


(1) Transient brainstem ischemia


Current Visit: Yes   Status: Acute   Code(s): G45.8 - OTH TRANSIENT CEREBRAL 

ISCHEMIC ATTACKS AND RELATED SYND   SNOMED Code(s): 94221540


   





(2) Dysphagia


Current Visit: Yes   Status: Acute   Code(s): R13.10 - DYSPHAGIA, UNSPECIFIED   

SNOMED Code(s): 23810301


   





(3) Dizziness


Current Visit: Yes   Status: Acute   Code(s): R42 - DIZZINESS AND GIDDINESS   

SNOMED Code(s): 107472940


   





(4) History of colon cancer


Current Visit: Yes   Status: Acute   Code(s): Z85.038 - PERSONAL HISTORY OF 

MALIGNANT NEOPLASM OF LARGE INTESTINE   SNOMED Code(s): 760631389


   


Plan: 





This patient is a 87-year-old female being evaluated for possibility of TIA 

versus stroke.  She has been given clearance to proceed with MRI of the brain 

which is been ordered for tomorrow.  She is also awaiting further evaluation 

from ENT regarding swallowing difficulties.  In fact she is actually shown 

improvement with her swallowing and has not had trouble eating her meals today.

  We will await further recommendations from ENT.  She is to have EEG tomorrow 

and this will be reviewed.  Her carotid Doppler study came back with 5069 

percent stenosis of the right ICA.  We reviewed all test results today with the 

patient.  Her neurological examination remains nonfocal.  We will continue 

close neurological follow-up for the patient during this admission.

## 2018-07-16 VITALS
RESPIRATION RATE: 18 BRPM | DIASTOLIC BLOOD PRESSURE: 84 MMHG | HEART RATE: 87 BPM | TEMPERATURE: 97.9 F | SYSTOLIC BLOOD PRESSURE: 142 MMHG

## 2018-07-16 RX ADMIN — ASPIRIN 325 MG ORAL TABLET SCH: 325 PILL ORAL at 10:26

## 2018-07-16 RX ADMIN — LOSARTAN POTASSIUM AND HYDROCHLOROTHIAZIDE SCH EACH: 12.5; 5 TABLET ORAL at 10:26

## 2018-07-16 RX ADMIN — Medication SCH UNIT: at 10:25

## 2018-07-16 RX ADMIN — Medication SCH MG: at 10:25

## 2018-07-16 NOTE — EEG
ELECTROENCEPHALOGRAM REPORT



DATE OF EE2018.



REFERRING PHYSICIAN:

Dr. Contreras



CONSULTING/INTERPRETING PHYSICIAN:

Dr. Shiva Odom MD



ELECTROENCEPHALOGRAPHIC EXAMINATION REPORT:



INDICATION FOR EXAMINATION:

This patient is an 87-year-old female, admitted with episode of dizziness and

dysphagia.  MRI findings indicate acute pontine stroke.



[age]:

[age].



EEG FINDINGS:

A routine 21 channel awake digital EEG recording was accomplished utilizing the 10-20

international system with bipolar and referential montages.  The background activity in

the most alert resting state consists of a low to medium amplitude, fairly well

developed and well sustained 7-8 Hz activity over the posterior head regions.  This

posterior rhythm attenuates to eye opening.  There is a small amount of low amplitude

18-20 Hz beta activity seen maximally over the anterior head regions.  Muscle and

movement artifact was observed on a few occasions during the tracing.



Hyperventilation was not performed.  Photic stimulation at flash frequencies of 2-30 Hz

produced a good symmetrical occipital driving response.  No epileptiform discharges

were seen.



IMPRESSION:

This EEG is within normal limits for the patient's age.  The EEG failed to reveal any

focal, lateralized, or epileptiform abnormalities.  Clinical correlation is

recommended.





MMODL / IJN: [id number] / Job#: [id number]

## 2018-07-16 NOTE — P.PN
Subjective


Progress Note Date: 02/16/18





This patient is a 87-year-old female who was admitted just stay for symptoms of 

dizziness and dysphagia.  The patient presented with new onset of symptoms and 

was initially seen in the emergency room for possibility of TIA versus stroke.  

She underwent a computed tomography scan of the brain which failed to reveal 

any acute changes.  She was admitted to Hospital for further evaluation.  

Yesterday her neurological examination was showing significant improvement with 

her dysphagia.  She is waiting to be seen in consultation by ENT for further 

evaluation.  Given her history of dysphagia and dizziness it was recommended 

the patient undergo MRI of the brain.  Apparently she told the nursing staff 

today that she would decline the MRI as she is undergone bilateral knee 

replacements.  After checking with MRI this was not a contraindication for her 

MRI study for today however the patient has declined to have MRI brain done at 

this time.  Patient underwent carotid Doppler ultrasound today and results 

indicated 5069 percent stenosis of the proximal right ICA.  Patient seems to 

be doing fairly well today.  She denies any difficulty with her swallowing 

today.  She is still awaiting ENT consultation.  According to the nursing staff 

orthopedic surgery has been consulted for evaluation of her knee replacements 

and compatibility for MRI.  We will continue to follow her progress closely 

during this admission.  We have discussed all results with her thus far in 

detail and she does seem to be doing better today.  According to the patient 

she has been given okay to proceed with MRI of the brain.  This is been 

scheduled for her to be done tomorrow.  She will also have her EEG done today 

and this will be reviewed.  Her EEG was completed and we did review the 

results.  EEG is within normal limits for age.  She did undergo MRI of the 

brain today which reveals evidence of a pontine stroke.  Patient is recommended 

to continue close follow-up in the outpatient neurology clinic.  She is to 

continue on one baby aspirin daily for secondary stroke prevention.  Patient 

has not been seen by ENT but should follow up as outpatient.  Patient is being 

considered for discharge home today and should follow-up in the outpatient 

neurology clinic in 3-4 weeks.  Her overall prognosis at this time remains 

guarded.





Objective





- Vital Signs


Vital signs: 


 Vital Signs











Temp  97.8 F   07/16/18 12:00


 


Pulse  82   07/16/18 12:00


 


Resp  16   07/16/18 12:00


 


BP  157/76   07/16/18 12:00


 


Pulse Ox  97   07/16/18 12:00








 Intake & Output











 07/15/18 07/16/18 07/16/18





 18:59 06:59 18:59


 


Intake Total 720 480 380


 


Balance 720 480 380


 


Weight  77.8 kg 


 


Intake:   


 


  Oral 720 480 380


 


Other:   


 


  Voiding Method  Toilet Toilet


 


  # Voids 1 3 2














- Exam





Physical examination:





PHYSICAL EXAMINATION: Patient is resting comfortably in bed. 


VITAL SIGNS: Blood pressure is [142/84]. Heart rate is [87]. Respiration is [18]

. Temperature is [97.9].


HEENT: Head is atraumatic, neck is supple, there were no carotid bruits.


CHEST: Lungs are clear to auscultation and percussion.  


CARDIAC: S1, S2 normal rate and rhythm. There is no murmur.


ABDOMEN: Soft and nontender. Bowel sounds are present.


EXTREMITIES:  There is no pedal edema.  Peripheral pulses are present.





Neurological examination:





Patient has a nonfocal neurological examination today.  Patient denies any 

swallowing difficulties at this time and is been up and ambulating without 

vertigo or dizzy spells.  Patient denies any swallowing difficulties today.





- Labs


CBC & Chem 7: 


 07/13/18 12:06





 07/13/18 12:06





Assessment and Plan


(1) Transient brainstem ischemia


Status: Acute   Code(s): G45.8 - OTH TRANSIENT CEREBRAL ISCHEMIC ATTACKS AND 

RELATED SYND   SNOMED Code(s): 11821117


   





(2) Dysphagia


Status: Acute   Code(s): R13.10 - DYSPHAGIA, UNSPECIFIED   SNOMED Code(s): 

20247187


   





(3) Dizziness


Status: Acute   Code(s): R42 - DIZZINESS AND GIDDINESS   SNOMED Code(s): 

958917360


   





(4) History of colon cancer


Status: Acute   Code(s): Z85.038 - PERSONAL HISTORY OF MALIGNANT NEOPLASM OF 

LARGE INTESTINE   SNOMED Code(s): 800801481


   


Plan: 





This patient is a 87-year-old female was being evaluated for recent episode of 

dizziness and dysphagia.  She was able to complete MRI of the brain today which 

was reviewed.  MRI reveals evidence of a subacute infarct in the krzysztof.  We have 

recommended the patient begin on one baby aspirin 81 mg daily for secondary 

stroke prevention.  Her blood pressure medications were also adjusted today.  

She is also been started on Lipitor 20 mg daily.  Her recent total cholesterol 

done in Hospital was 218.  She underwent routine EEG today which is reviewed 

and is normal for age.  We are recommending the patient to follow-up in the 

outpatient neurology clinic in 3-4 weeks.  Case was discussed with the patient 

and her daughter at bedside.  All of their questions were answered to the best 

of my ability.  They will continue close follow-up and as mentioned we'll 

schedule for a follow-up in the outpatient neurology clinic as recommended.  

Her overall prognosis at this time remains guarded.

## 2018-07-16 NOTE — MR
MR brain without contrast

 

HISTORY: Dizziness and dysphagia

 

Multiplanar multisequence imaging obtained through the brain.

 

Correlation to CT brain 7/13/2018

 

Restricted diffusion is present within the philip to the right of midline. There is corresponding hyper
intensity on inversion recovery and T2-weighted sequences. Cortical atrophy is noted. Scattered and c
onfluent hyperintensities are present in the periventricular, subcortical and deep white matter. No h
emorrhage or hydrocephalus. There are normal vascular flow voids. Orbits show symmetric appearance. C
orpus callosum, cervical medullary junction, cerebellopontine angles are unremarkable. Paranasal sinu
ses are well aerated. There is a partially empty sella.

 

IMPRESSION: Philip subacute infarct. Chronic small vessel ischemic changes and age-related atrophy.

## 2018-07-16 NOTE — PN
PROGRESS NOTE



DATE OF SERVICE:

07/15/2018



PRESENTING COMPLAINT:

Difficulty walking and _____



INTERVAL HISTORY:

This patient was seen by me yesterday.  The patient's symptoms were greatly improved.

We are pending a decision from Orthopedics to see if patient can have an MRI because of

the hardware in the knees.  Otherwise, patient is comfortable, no neuro symptoms.



REVIEW OF SYSTEMS:

Review of systems done for constitutional, cardiovascular, GI, pulmonary; relevant

findings as above.



CURRENT MEDICATIONS:

Current medications are reviewed that include aspirin and Lipitor.



PHYSICAL EXAMINATION:

On examination, temperature 97.4, pulse 80, respiratory 18, blood pressure 153/113,

pulse ox 96% on room air.

GENERAL APPEARANCE:  Sitting up, comfortable.

EYES: Pupils equal. Conjunctivae normal.

HENT:  External appearance of nose and ears normal. Oral cavity normal.

NECK: JVD not raised.  Mass not palpable.

RESPIRATORY: Effort normal.

Lungs are clear.

CARDIOVASCULAR: First and second sounds normal. No edema.

ABDOMEN:  Soft, nontender.  Liver and spleen not palpable.

PSYCHIATRY:  Alert and oriented x3. Mood and affect normal.

NEUROLOGICAL: No focal deficits.



INVESTIGATIONS:

.



ASSESSMENT:

1. Possible transient ischemic attack versus stroke pending decision as per MRI.

2. Obesity, body mass index 31.3.

3. Hyperlipidemia, uncontrolled.  LDL is 131.

4. Essential hypertension accelerated on presentation.

5. Chronic lymphedema of the right arm, idiopathic.

6. Primary osteoarthritis especially of the hands.



PLAN:

I did speak to the PA from [facility].  She is going to get back to us about

to see that patient can have the MRI. Later when I saw the patient, the patient did

tell me that she is okay to have the MRI.  Hence, MRI is pending at this point. Also 2D

echo is pending so is the EEG. Care was discussed with the patient.





MMODL / IJN: [id number] / Job#: [id number]

## 2018-07-17 NOTE — ECHOF
Referral Reason:STROKE-R/O THROMBUS



MEASUREMENTS

--------

HEIGHT: 157.5 cm

WEIGHT: 77.6 kg

BP: 151/126

RVIDd:   3.1 cm     (< 3.3)

IVSd:   1.3 cm     (0.6 - 1.1)

LVIDd:   4.7 cm     (3.9 - 5.3)

LVPWd:   1.3 cm     (0.6 - 1.1)

IVSs:   1.8 cm

LVIDs:   2.9 cm

LVPWs:   1.8 cm

LAESV Index (A-L):   19.21 ml/m

Ao Diam:   3.3 cm     (2.0 - 3.7)

AV Cusp:   1.4 cm     (1.5 - 2.6)

LA Diam:   3.9 cm     (2.7 - 3.8)

MV E Clifton:   0.84 m/s

MV DecT:   636 ms

MV A Clifotn:   1.47 m/s

MV E/A Ratio:   0.57 

RAP:   5.00 mmHg

RVSP:   15.50 mmHg







FINDINGS

--------

Sinus rhythm.

This was a technically adequate study.

The left ventricular size is normal.   There is mild concentric left ventricular hypertrophy.   Overa
ll left ventricular systolic function is normal with, an EF between 55 - 60 %.

The right ventricle is normal in size and function.

Normal LA  size by volume 22+/-6 ml/m2.

The right atrium is normal in size.

There is mild aortic valve sclerosis.   Trace to mild aortic regurgitation.   There is no evidence of
 aortic stenosis.

The mitral valve leaflets are mildly thickened.   Mild mitral regurgitation is present.

Trace tricuspid regurgitation present.   Right ventricular systolic pressure is normal at < 35 mmHg. 
  There is no evidence of pulmonary hypertension.

The pulmonic valve was not well visualized.

The aortic root size is normal.

Normal inferior vena cava with normal inspiratory collapse consistent with estimated right atrial pre
ssure of  5 mmHg.

There is no pericardial effusion.



CONCLUSIONS

--------

1. Sinus rhythm.

2. This was a technically adequate study.

3. The left ventricular size is normal.

4. There is mild concentric left ventricular hypertrophy.

5. Overall left ventricular systolic function is normal with, an EF between 55 - 60 %.

6. Normal LA size by volume 22+/-6 ml/m2.

7. There is mild aortic valve sclerosis.

8. Trace to mild aortic regurgitation.

9. The mitral valve leaflets are mildly thickened.

10. Mild mitral regurgitation is present.

11. Trace tricuspid regurgitation present.

12. Right ventricular systolic pressure is normal at < 35 mmHg.

13. There is no evidence of pulmonary hypertension.

14. The pulmonic valve was not well visualized.

15. The aortic root size is normal.

16. There is no pericardial effusion.





SONOGRAPHER: Lloyd Melendez RDCS

## 2018-07-17 NOTE — DS
DISCHARGE SUMMARY



FINAL DIAGNOSES:

1. Acute pontine infarct, improved.

2. Gait dysfunction.

3. Obesity.

4. Hyperlipidemia.

5. Hypertension.

6. Chronic lymphedema.

7. Degenerative joint disease.



DISCHARGE DISPOSITION:

The patient is being discharged in stable condition with guarded prognosis.



HISTORY OF PRESENT ILLNESS:

This 87-year-old woman with past medical history of multiple medical problems admitted

with gait dysfunction.  MRA showed small pontine stroke. Neurology saw the patient and

cleared the patient for discharge.



On exam, vitals are stable.

Cardiovascular: S1, S2 muffled.

Respiratory: Breath sounds diminished in the bases.

Abdomen: Soft.

Nervous System: No focal deficit.



DISCHARGE ADVICE:

1. Diet is cardiac.

2. Activity limited until followup.

3. Follow up with Dr. Luna in 2-3 days.

4. Follow up with Neurology as recommended.



MEDICATIONS:

1. Ziac 10 mg p.o. daily.

2. Vitamin D3 1000 daily.

3. Iron 320 mg p.o. q.48h hours.

4. Multivitamin 1 p.o. daily.

5. Omega-3 1000 mg b.i.d.

6. Aspirin 325 mg p.o. daily.

7. Lipitor 20 mg q.h.s.

8. Losartan hydrochlorothiazide 50/12.5 mg p.o. b.i.d.

9. Lopressor 25 mg p.o. b.i.d.

Closely monitor blood pressure in the outpatient setting and aspirin reduced to baby

aspirin in the outpatient setting.  Further recommendations to follow.





MMODL / WINSOMEN: [id number] / Job#: [id number]

## 2018-10-22 ENCOUNTER — HOSPITAL ENCOUNTER (OUTPATIENT)
Dept: HOSPITAL 47 - RADMAMWWP | Age: 83
Discharge: HOME | End: 2018-10-22
Attending: SURGERY
Payer: MEDICARE

## 2018-10-22 DIAGNOSIS — R92.8: Primary | ICD-10-CM

## 2018-10-22 PROCEDURE — 77065 DX MAMMO INCL CAD UNI: CPT

## 2018-10-22 PROCEDURE — 77061 BREAST TOMOSYNTHESIS UNI: CPT

## 2018-10-22 NOTE — MM
Reason for exam: follow-up at short interval from prior study.

Last mammogram was performed 6 months ago.



History:

Patient is postmenopausal and has history of colon cancer at age 60.

Benign US biopsy breast VAD RT of the right breast, April 23, 2018.



Physical Findings:

Nurse did not find any significant physical abnormalities on exam.



MG 3D Diag Mammo W/Cad RT

CC and MLO view(s) were taken of the right breast.

Prior study comparison: April 13, 2018, bilateral MG 3d diag mammo w/cad MILDRED.  

April 2, 2012, bilateral digital screening mammo w/CAD.

There are scattered fibroglandular densities.  There is no discrete abnormality.

No significant new findings when compared with previous films.



These results were verbally communicated with the patient and result sheet given 

to the patient on 10/22/18.





ASSESSMENT: Benign, BI-RAD 2



RECOMMENDATION:

Routine screening mammogram of both breasts in 6 months.

## 2018-10-29 ENCOUNTER — HOSPITAL ENCOUNTER (OUTPATIENT)
Dept: HOSPITAL 47 - RADMRIMAIN | Age: 83
End: 2018-10-29
Attending: PSYCHIATRY & NEUROLOGY
Payer: MEDICARE

## 2018-10-29 DIAGNOSIS — M51.17: ICD-10-CM

## 2018-10-29 DIAGNOSIS — M46.97: ICD-10-CM

## 2018-10-29 DIAGNOSIS — M48.061: Primary | ICD-10-CM

## 2018-10-29 DIAGNOSIS — Z13.89: ICD-10-CM

## 2018-10-29 LAB — BUN SERPL-SCNC: 30 MG/DL (ref 7–17)

## 2018-10-29 PROCEDURE — 72158 MRI LUMBAR SPINE W/O & W/DYE: CPT

## 2018-10-29 PROCEDURE — 36415 COLL VENOUS BLD VENIPUNCTURE: CPT

## 2018-10-29 PROCEDURE — 84520 ASSAY OF UREA NITROGEN: CPT

## 2018-10-29 PROCEDURE — 82565 ASSAY OF CREATININE: CPT

## 2018-10-29 NOTE — MR
EXAMINATION TYPE: MR lumbar spine wo/w con

 

DATE OF EXAM: 10/29/2018

 

COMPARISON: NONE

 

HISTORY: Back pain

 

CONTRAST: 7.5 mL Gadavist

 

TECHNIQUE: T1 and T2  axial and sagittal, postcontrast T1 sagittal and axial images of the lumbar spi
ne are submitted.  

 

FINDINGS: There is no abnormal signal seen within the visualized spinal cord or paraspinal soft tissu
es. Simple appearing renal cysts are noted bilaterally. Aorta of normal caliber.

 

At L1-2 there is facet arthropathy. No disc herniation or canal stenosis. No foraminal encroachment.

 

At L2-3 there is diffuse circumferential disc bulging with hypertrophy of the facets and ligamentum f
lavum. Disc bulging greater laterally to the left. Mild effacement of thecal sac. Mild bilateral fora
marilu encroachment greater on the left and borderline central stenosis.

 

At L3-4 there is diffuse broad-based disc bulging with facet arthropathy and ligamentum flavum hypert
rophy. There is borderline central stenosis and mild bilateral foraminal encroachment.

 

At L4-5 there is a grade 1 anterolisthesis with degenerative disc disease. There is advanced facet ar
thropathy and ligamentum flavum with diffuse disc bulging resulting in moderate central stenosis and 
bilateral mild foraminal encroachment. There is a rounded structure along the upper margin of the fac
et joint on the right measuring 5 mm. This does result in some lateral impression upon the thecal sac
 on the postcontrast images.

 

At L5-S1 there is advanced facet arthropathy. No disc herniation or canal stenosis. Neural foramina a
re patent.

 

 

IMPRESSION:

1. Multilevel mild degenerative disc disease with more advanced facet arthropathy at multiple levels 
particularly noted at L4-5 and L5-S1. Grade 1 anterolisthesis of L5 on S1.

2. Disc bulging and hypertrophic changes result in multilevel borderline canal stenosis with moderate
 central stenosis at L4-L5.

3. There is a rounded 5 mm area of abnormal signal adjacent to the right facet joint superiorly at L4
. There is rim enhancement. The area of signal results in lateral effacement of thecal sac at the L4 
level. Differential diagnosis would include a synovial cyst. A small extruded disc fragment not exclu
ded correlate clinically.

## 2019-02-08 ENCOUNTER — HOSPITAL ENCOUNTER (OUTPATIENT)
Dept: HOSPITAL 47 - RADNMMAIN | Age: 84
Discharge: HOME | End: 2019-02-08
Payer: MEDICARE

## 2019-02-08 DIAGNOSIS — R94.31: Primary | ICD-10-CM

## 2019-02-08 PROCEDURE — 93351 STRESS TTE COMPLETE: CPT

## 2019-02-08 NOTE — ECHOS
STRESS ECHOCARDIOGRAM



INDICATIONS:

Surgical clearance.



BASELINE HEART RATE:

65



BASELINE BLOOD PRESSURE:

110/43



MAXIMUM HEART RATE:

114



MAXIMUM BLOOD PRESSURE:

213/107



85% MPHR:

113



100% MPHR:

133



MAXIMUM STAGE REACHED:

3



TOTAL EXERCISE TIME:

7:30



CLINICAL INFORMATION:

Baseline EKG shows sinus rhythm with right bundle branch block.  The patient was given

intravenous dobutamine over a period of 7.5 minutes as per protocol.  Did not have

chest pain or diagnostic ST-segment depression; 86% of predicted maximum heart rate was

obtained.  Baseline echo shows normal left ventricular size and wall motion systolic

function. Post dobutamine infusion, there is normal hyperdynamic response of all

segments of myocardium noted.



CONCLUSION:

1. Inconclusive EKG part of the stress test due to baseline EKG changes.

2. Negative dobutamine echo.





MMODL / IJN: [id number] / Job#: [id number]

## 2020-01-16 ENCOUNTER — HOSPITAL ENCOUNTER (EMERGENCY)
Dept: HOSPITAL 47 - EC | Age: 85
Discharge: HOME | End: 2020-01-16
Payer: MEDICARE

## 2020-01-16 VITALS — HEART RATE: 76 BPM | RESPIRATION RATE: 19 BRPM | TEMPERATURE: 97.7 F

## 2020-01-16 VITALS — DIASTOLIC BLOOD PRESSURE: 78 MMHG | SYSTOLIC BLOOD PRESSURE: 195 MMHG

## 2020-01-16 DIAGNOSIS — Z85.038: ICD-10-CM

## 2020-01-16 DIAGNOSIS — Z88.0: ICD-10-CM

## 2020-01-16 DIAGNOSIS — Z88.1: ICD-10-CM
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
## 2020-01-16 DIAGNOSIS — Z23: ICD-10-CM

## 2020-01-16 DIAGNOSIS — Z88.2: ICD-10-CM

## 2020-01-16 DIAGNOSIS — Z79.899: ICD-10-CM

## 2020-01-16 DIAGNOSIS — D64.9: ICD-10-CM
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
## 2020-01-16 DIAGNOSIS — S51.811A: Primary | ICD-10-CM

## 2020-01-16 DIAGNOSIS — Z90.49: ICD-10-CM

## 2020-01-16 PROCEDURE — 99282 EMERGENCY DEPT VISIT SF MDM: CPT

## 2020-01-16 PROCEDURE — 90715 TDAP VACCINE 7 YRS/> IM: CPT

## 2020-01-16 PROCEDURE — 12002 RPR S/N/AX/GEN/TRNK2.6-7.5CM: CPT

## 2020-01-16 PROCEDURE — 90471 IMMUNIZATION ADMIN: CPT

## 2020-01-16 NOTE — ED
Wound/Laceration HPI





- General


Chief Complaint: Wound/Laceration


Stated Complaint: Fall w/laceration


Time Seen by Provider: 20 05:20


Source: patient, family


Mode of arrival: ambulatory


Limitations: no limitations





- History of Present Illness


Initial Comments: 





This patient is an 88-year-old woman who states that she took a tumble over her 

walker this morning approximately an hour ago.  The patient states that in the 

process of this she sustained a laceration to her right forearm.  She also had 

some small abrasions to her left lower leg.  Patient also believes that she had 

struck her left foot but does not believe there is a serious injury.  She was 

able to ambulate without severe pain.  The patient denies any other injury.


Onset/Timin


-: hour(s)


Extremity Location: Right: Forearm


Place: home


Patient Tetanus UTD: No


Context: accidental, fall


Associated Symptoms: none





- Related Data


                                Home Medications











 Medication  Instructions  Recorded  Confirmed


 


Cholecalciferol [Vitamin D3 (25 1,000 unit PO DAILY 17





Mcg = 1000 Iu)]   


 


Ferrous Sulfate [Iron (65  mg PO Q48H 17





Elemental)]   


 


Multivitamins, Thera [Multivitamin 1 tab PO Q48H 17





(formulary)]   


 


Omega-3 Fatty Acids [Omega-3] 1,000 mg PO DAILY 18


 


Cetirizine HCl [Zyrtec] 10 mg PO DAILY 18








                                  Previous Rx's











 Medication  Instructions  Recorded


 


Aspirin 325 mg PO DAILY #30 tab 18


 


Atorvastatin Calcium [Lipitor] 20 mg PO HS #30 tab 18


 


Losartan-Hctz 50-12.5 mg [Hyzaar 1 each PO BID #60 tab 18





50-12.5]  


 


Metoprolol Tartrate [Lopressor] 25 mg PO BID #60 tab 18











                                    Allergies











Allergy/AdvReac Type Severity Reaction Status Date / Time


 


clarithromycin [From Biaxin] Allergy  Rash/Hives Verified 20 05:14


 


Penicillins Allergy  Unknown Verified 20 05:14


 


Sulfa (Sulfonamide Allergy  ringing in Verified 20 05:14





Antibiotics)   ears  














Review of Systems


ROS Statement: 


Those systems with pertinent positive or pertinent negative responses have been 

documented in the HPI.





ROS Other: All systems not noted in ROS Statement are negative.


Constitutional: Denies: fever


Respiratory: Denies: cough, dyspnea


Cardiovascular: Denies: chest pain, palpitations, syncope


Gastrointestinal: Denies: abdominal pain, vomiting


Musculoskeletal: Denies: back pain


Skin: Denies: rash


Neurological: Denies: headache, weakness, numbness


Hematological/Lymphatic: Denies: easy bleeding





Past Medical History


Past Medical History: Cancer, Hypertension


Additional Past Medical History / Comment(s): anemia, hx colon cancer, 

lymphedema right arm


History of Any Multi-Drug Resistant Organisms: None Reported


Past Surgical History: Back Surgery, Bowel Resection, Joint Replacement


Additional Past Surgical History / Comment(s): cesar knee replacements, cesar 

cataract with lens implants


Past Anesthesia/Blood Transfusion Reactions: No Reported Reaction


Past Psychological History: No Psychological Hx Reported


Smoking Status: Never smoker


Past Alcohol Use History: None Reported


Past Drug Use History: None Reported





- Past Family History


  ** Mother


Family Medical History: Cancer


Additional Family Medical History / Comment(s): colon





General Exam


Limitations: no limitations


General appearance: alert, in no apparent distress


Extremities exam: Present: normal inspection, full ROM.  Absent: tenderness


Neurological exam: Present: alert


Skin exam: Present: warm, dry, normal color, other (There is an approximately 3 

cm laceration to the right forearm.).  Absent: rash





Course





                                   Vital Signs











  20





  05:10


 


Temperature 97.5 F L


 


Pulse Rate 86


 


Respiratory 20





Rate 


 


Blood Pressure 195/78


 


O2 Sat by Pulse 97





Oximetry 














Procedures





- Laceration


  ** Laceration #1


Consent Obtained: verbal consent


Indication: laceration


Site: upper extremity


Description: linear


Depth: simple, single layer


Type of Sutures: other (Skin glue)


Patient Tolerated Procedure: well, no complications





Disposition


Clinical Impression: 


 Laceration





Disposition: HOME SELF-CARE


Condition: Good


Instructions (If sedation given, give patient instructions):  Skin Adhesive Care

(ED), Laceration (DC)


Is patient prescribed a controlled substance at d/c from ED?: No


Referrals: 


Kuldip Luna MD [Primary Care Provider] - 1-2 days